# Patient Record
Sex: MALE | Race: WHITE | NOT HISPANIC OR LATINO | URBAN - NONMETROPOLITAN AREA
[De-identification: names, ages, dates, MRNs, and addresses within clinical notes are randomized per-mention and may not be internally consistent; named-entity substitution may affect disease eponyms.]

---

## 2021-09-03 ENCOUNTER — OFFICE VISIT (OUTPATIENT)
Dept: PSYCHIATRY | Facility: CLINIC | Age: 31
End: 2021-09-03

## 2021-09-03 VITALS
OXYGEN SATURATION: 98 % | DIASTOLIC BLOOD PRESSURE: 64 MMHG | HEART RATE: 92 BPM | WEIGHT: 131 LBS | BODY MASS INDEX: 25.72 KG/M2 | HEIGHT: 60 IN | SYSTOLIC BLOOD PRESSURE: 120 MMHG

## 2021-09-03 DIAGNOSIS — F33.1 MODERATE EPISODE OF RECURRENT MAJOR DEPRESSIVE DISORDER (HCC): ICD-10-CM

## 2021-09-03 DIAGNOSIS — F41.1 GENERALIZED ANXIETY DISORDER: ICD-10-CM

## 2021-09-03 DIAGNOSIS — Z79.899 MEDICATION MANAGEMENT: ICD-10-CM

## 2021-09-03 DIAGNOSIS — F39 MOOD DISORDER (HCC): ICD-10-CM

## 2021-09-03 DIAGNOSIS — F10.29 ALCOHOL DEPENDENCE WITH UNSPECIFIED ALCOHOL-INDUCED DISORDER (HCC): Primary | ICD-10-CM

## 2021-09-03 LAB
AMPHETAMINE CUT-OFF: NORMAL
BENZODIAZIPINE CUT-OFF: NORMAL
BUPRENORPHINE CUT-OFF: NORMAL
COCAINE CUT-OFF: NORMAL
EXTERNAL AMPHETAMINE SCREEN URINE: NEGATIVE
EXTERNAL BENZODIAZEPINE SCREEN URINE: NEGATIVE
EXTERNAL BUPRENORPHINE SCREEN URINE: NEGATIVE
EXTERNAL COCAINE SCREEN URINE: NEGATIVE
EXTERNAL MDMA: NEGATIVE
EXTERNAL METHADONE SCREEN URINE: NEGATIVE
EXTERNAL METHAMPHETAMINE SCREEN URINE: NEGATIVE
EXTERNAL OPIATES SCREEN URINE: NEGATIVE
EXTERNAL OXYCODONE SCREEN URINE: NEGATIVE
EXTERNAL THC SCREEN URINE: NEGATIVE
MDMA CUT-OFF: NORMAL
METHADONE CUT-OFF: NORMAL
METHAMPHETAMINE CUT-OFF: NORMAL
OPIATES CUT-OFF: NORMAL
OXYCODONE CUT-OFF: NORMAL
THC CUT-OFF: NORMAL

## 2021-09-03 PROCEDURE — 90792 PSYCH DIAG EVAL W/MED SRVCS: CPT | Performed by: NURSE PRACTITIONER

## 2021-10-01 ENCOUNTER — OFFICE VISIT (OUTPATIENT)
Dept: PSYCHIATRY | Facility: CLINIC | Age: 31
End: 2021-10-01

## 2021-10-01 ENCOUNTER — LAB (OUTPATIENT)
Dept: LAB | Facility: HOSPITAL | Age: 31
End: 2021-10-01

## 2021-10-01 VITALS
BODY MASS INDEX: 26.5 KG/M2 | OXYGEN SATURATION: 96 % | HEIGHT: 60 IN | SYSTOLIC BLOOD PRESSURE: 126 MMHG | DIASTOLIC BLOOD PRESSURE: 68 MMHG | TEMPERATURE: 97.9 F | HEART RATE: 105 BPM | WEIGHT: 135 LBS

## 2021-10-01 DIAGNOSIS — F10.29 ALCOHOL DEPENDENCE WITH UNSPECIFIED ALCOHOL-INDUCED DISORDER (HCC): Primary | ICD-10-CM

## 2021-10-01 DIAGNOSIS — F39 MOOD DISORDER (HCC): ICD-10-CM

## 2021-10-01 DIAGNOSIS — F41.1 GENERALIZED ANXIETY DISORDER: ICD-10-CM

## 2021-10-01 DIAGNOSIS — F33.1 MODERATE EPISODE OF RECURRENT MAJOR DEPRESSIVE DISORDER (HCC): ICD-10-CM

## 2021-10-01 DIAGNOSIS — Z79.899 MEDICATION MANAGEMENT: ICD-10-CM

## 2021-10-01 PROCEDURE — 84443 ASSAY THYROID STIM HORMONE: CPT | Performed by: NURSE PRACTITIONER

## 2021-10-01 PROCEDURE — 99214 OFFICE O/P EST MOD 30 MIN: CPT | Performed by: NURSE PRACTITIONER

## 2021-10-01 PROCEDURE — 80053 COMPREHEN METABOLIC PANEL: CPT | Performed by: NURSE PRACTITIONER

## 2021-10-01 PROCEDURE — 85025 COMPLETE CBC W/AUTO DIFF WBC: CPT | Performed by: NURSE PRACTITIONER

## 2021-10-01 PROCEDURE — 84439 ASSAY OF FREE THYROXINE: CPT | Performed by: NURSE PRACTITIONER

## 2021-10-01 PROCEDURE — 80061 LIPID PANEL: CPT | Performed by: NURSE PRACTITIONER

## 2021-10-01 RX ORDER — SERTRALINE HYDROCHLORIDE 25 MG/1
25 TABLET, FILM COATED ORAL DAILY
Qty: 30 TABLET | Refills: 0 | Status: SHIPPED | OUTPATIENT
Start: 2021-10-01 | End: 2021-10-29 | Stop reason: SDUPTHER

## 2021-10-02 LAB
ALBUMIN SERPL-MCNC: 4.7 G/DL (ref 3.5–5.2)
ALBUMIN/GLOB SERPL: 2.1 G/DL
ALP SERPL-CCNC: 61 U/L (ref 39–117)
ALT SERPL W P-5'-P-CCNC: 24 U/L (ref 1–41)
ANION GAP SERPL CALCULATED.3IONS-SCNC: 13.8 MMOL/L (ref 5–15)
AST SERPL-CCNC: 25 U/L (ref 1–40)
BASOPHILS # BLD AUTO: 0.05 10*3/MM3 (ref 0–0.2)
BASOPHILS NFR BLD AUTO: 0.6 % (ref 0–1.5)
BILIRUB SERPL-MCNC: <0.2 MG/DL (ref 0–1.2)
BUN SERPL-MCNC: 14 MG/DL (ref 6–20)
BUN/CREAT SERPL: 19.2 (ref 7–25)
CALCIUM SPEC-SCNC: 9.3 MG/DL (ref 8.6–10.5)
CHLORIDE SERPL-SCNC: 107 MMOL/L (ref 98–107)
CHOLEST SERPL-MCNC: 131 MG/DL (ref 0–200)
CO2 SERPL-SCNC: 20.2 MMOL/L (ref 22–29)
CREAT SERPL-MCNC: 0.73 MG/DL (ref 0.76–1.27)
DEPRECATED RDW RBC AUTO: 44.2 FL (ref 37–54)
EOSINOPHIL # BLD AUTO: 0.26 10*3/MM3 (ref 0–0.4)
EOSINOPHIL NFR BLD AUTO: 3 % (ref 0.3–6.2)
ERYTHROCYTE [DISTWIDTH] IN BLOOD BY AUTOMATED COUNT: 12.6 % (ref 12.3–15.4)
GFR SERPL CREATININE-BSD FRML MDRD: 125 ML/MIN/1.73
GLOBULIN UR ELPH-MCNC: 2.2 GM/DL
GLUCOSE SERPL-MCNC: 91 MG/DL (ref 65–99)
HCT VFR BLD AUTO: 43.8 % (ref 37.5–51)
HDLC SERPL-MCNC: 33 MG/DL (ref 40–60)
HGB BLD-MCNC: 14.8 G/DL (ref 13–17.7)
IMM GRANULOCYTES # BLD AUTO: 0.02 10*3/MM3 (ref 0–0.05)
IMM GRANULOCYTES NFR BLD AUTO: 0.2 % (ref 0–0.5)
LDLC SERPL CALC-MCNC: 84 MG/DL (ref 0–100)
LDLC/HDLC SERPL: 2.57 {RATIO}
LYMPHOCYTES # BLD AUTO: 2.61 10*3/MM3 (ref 0.7–3.1)
LYMPHOCYTES NFR BLD AUTO: 30.5 % (ref 19.6–45.3)
MCH RBC QN AUTO: 32.5 PG (ref 26.6–33)
MCHC RBC AUTO-ENTMCNC: 33.8 G/DL (ref 31.5–35.7)
MCV RBC AUTO: 96.1 FL (ref 79–97)
MONOCYTES # BLD AUTO: 0.95 10*3/MM3 (ref 0.1–0.9)
MONOCYTES NFR BLD AUTO: 11.1 % (ref 5–12)
NEUTROPHILS NFR BLD AUTO: 4.68 10*3/MM3 (ref 1.7–7)
NEUTROPHILS NFR BLD AUTO: 54.6 % (ref 42.7–76)
NRBC BLD AUTO-RTO: 0 /100 WBC (ref 0–0.2)
PLATELET # BLD AUTO: 275 10*3/MM3 (ref 140–450)
PMV BLD AUTO: 10.5 FL (ref 6–12)
POTASSIUM SERPL-SCNC: 4.2 MMOL/L (ref 3.5–5.2)
PROT SERPL-MCNC: 6.9 G/DL (ref 6–8.5)
RBC # BLD AUTO: 4.56 10*6/MM3 (ref 4.14–5.8)
SODIUM SERPL-SCNC: 141 MMOL/L (ref 136–145)
T4 FREE SERPL-MCNC: 1.17 NG/DL (ref 0.93–1.7)
TRIGL SERPL-MCNC: 66 MG/DL (ref 0–150)
TSH SERPL DL<=0.05 MIU/L-ACNC: 1.08 UIU/ML (ref 0.27–4.2)
VLDLC SERPL-MCNC: 14 MG/DL (ref 5–40)
WBC # BLD AUTO: 8.57 10*3/MM3 (ref 3.4–10.8)

## 2021-10-29 ENCOUNTER — TELEPHONE (OUTPATIENT)
Dept: PSYCHIATRY | Facility: CLINIC | Age: 31
End: 2021-10-29

## 2021-10-29 DIAGNOSIS — F41.1 GENERALIZED ANXIETY DISORDER: ICD-10-CM

## 2021-10-29 DIAGNOSIS — F33.1 MODERATE EPISODE OF RECURRENT MAJOR DEPRESSIVE DISORDER (HCC): ICD-10-CM

## 2021-10-29 DIAGNOSIS — F39 MOOD DISORDER (HCC): ICD-10-CM

## 2021-10-29 RX ORDER — SERTRALINE HYDROCHLORIDE 25 MG/1
25 TABLET, FILM COATED ORAL DAILY
Qty: 30 TABLET | Refills: 0 | Status: SHIPPED | OUTPATIENT
Start: 2021-10-29 | End: 2021-12-02 | Stop reason: SDUPTHER

## 2021-12-01 ENCOUNTER — OFFICE VISIT (OUTPATIENT)
Dept: PSYCHIATRY | Facility: CLINIC | Age: 31
End: 2021-12-01

## 2021-12-01 VITALS — WEIGHT: 146 LBS | BODY MASS INDEX: 28.51 KG/M2

## 2021-12-01 DIAGNOSIS — F39 MOOD DISORDER (HCC): ICD-10-CM

## 2021-12-01 DIAGNOSIS — F41.0 PANIC ATTACKS: ICD-10-CM

## 2021-12-01 DIAGNOSIS — F33.2 SEVERE EPISODE OF RECURRENT MAJOR DEPRESSIVE DISORDER, WITHOUT PSYCHOTIC FEATURES (HCC): Primary | ICD-10-CM

## 2021-12-01 DIAGNOSIS — F41.1 GENERALIZED ANXIETY DISORDER: ICD-10-CM

## 2021-12-01 DIAGNOSIS — T14.90XA TRAUMA: ICD-10-CM

## 2021-12-01 DIAGNOSIS — R45.4 EXCESSIVE ANGER: ICD-10-CM

## 2021-12-01 DIAGNOSIS — F10.29 ALCOHOL DEPENDENCE WITH UNSPECIFIED ALCOHOL-INDUCED DISORDER (HCC): ICD-10-CM

## 2021-12-01 PROCEDURE — 90791 PSYCH DIAGNOSTIC EVALUATION: CPT | Performed by: COUNSELOR

## 2021-12-01 RX ORDER — ONDANSETRON 4 MG/1
TABLET, ORALLY DISINTEGRATING ORAL
COMMUNITY
Start: 2021-10-02

## 2021-12-01 NOTE — PLAN OF CARE
Problem: Anger  Goal: Patient will develop specific, socially acceptable way to manage anger.  Outcome: Ongoing, Progressing     Problem: Anxiety  Goal: Patient will develop and implement behavioral and cognitive strategies to reduce anxiety and irrational fears.  Outcome: Ongoing, Progressing     Problem: Depression  Goal: Patient will demonstrate the ability to initiate new constructive life skills outside of sessions on a consistent basis.  Outcome: Ongoing, Progressing     Problem: Activity and Energy Impairment (Excessive Substance Use)  Goal: Optimized Energy Level (Excessive Substance Use)  Outcome: Ongoing, Progressing     Problem: Behavior Regulation Impairment (Excessive Substance Use)  Goal: Improved Behavioral Control (Excessive Substance Use)  Outcome: Ongoing, Progressing     Problem: Decreased Participation and Engagement (Excessive Substance Use)  Goal: Increased Participation and Engagement (Excessive Substance Use)  Outcome: Ongoing, Progressing     Problem: Physiologic Impairment (Excessive Substance Use)  Goal: Improved Physiologic Symptoms (Excessive Substance Use)  Outcome: Ongoing, Progressing  Flowsheets (Taken 12/1/2021 1627)  Mutually Determined Action Steps (Improved Physiologic Symptoms):   discusses use pattern   verbalizes physical symptoms   identifies change motive     Problem: Social, Occupational or Functional Impairment (Excessive Substance Use)  Goal: Enhanced Social, Occupational or Functional Skills (Excessive Substance Use)  Outcome: Ongoing, Progressing  Flowsheets (Taken 12/1/2021 1627)  Mutually Determined Action Steps (Enhanced Social, Occupational or Functional Skills):   participates in social skills training   identifies personal strengths   identifies support resources
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
## 2021-12-01 NOTE — TREATMENT PLAN
Multi-Disciplinary Problems (from Behavioral Health Treatment Plan)    Active Problems     Problem: Anger  Start Date: 12/01/21    Problem Details: The patient self-scales this problem as a 10 with 10 being the worst.        Goal Priority Start Date Expected End Date End Date    Patient will develop specific, socially acceptable way to manage anger. -- 12/01/21 -- --    Goal Details: Progress toward goal:  Not appropriate to rate progress toward goal since this is the initial treatment plan.        Goal Intervention Frequency Start Date End Date    Process patient's angry feelings or outbursts that have recently occurred and review alternative behaviors Q Month 12/01/21 --    Intervention Details: Duration of treatment until until remission of symptoms.        Goal Intervention Frequency Start Date End Date    Work with patient to develop constructive way to handle anger. Q Month 12/01/21 --    Intervention Details: Duration of treatment until until remission of symptoms.              Problem: Anxiety  Start Date: 12/01/21    Problem Details: The patient self-scales this problem as a 9 with 10 being the worst.        Goal Priority Start Date Expected End Date End Date    Patient will develop and implement behavioral and cognitive strategies to reduce anxiety and irrational fears. -- 12/01/21 -- --    Goal Details: Progress toward goal:  Not appropriate to rate progress toward goal since this is the initial treatment plan.        Goal Intervention Frequency Start Date End Date    Help patient explore past emotional issues in relation to present anxiety. Q Month 12/01/21 --    Intervention Details: Duration of treatment until until remission of symptoms.        Goal Intervention Frequency Start Date End Date    Help patient develop an awareness of their cognitive and physical responses to anxiety. Q Month 12/01/21 --    Intervention Details: Duration of treatment until until remission of symptoms.               Problem: Depression  Start Date: 12/01/21    Problem Details: The patient self-scales this problem as a 9 with 10 being the worst.        Goal Priority Start Date Expected End Date End Date    Patient will demonstrate the ability to initiate new constructive life skills outside of sessions on a consistent basis. -- 12/01/21 -- --    Goal Details: Progress toward goal:  Not appropriate to rate progress toward goal since this is the initial treatment plan.        Goal Intervention Frequency Start Date End Date    Assist patient in setting attainable activities of daily living goals. PRN 12/01/21 --    Goal Intervention Frequency Start Date End Date    Provide education about depression Q Month 12/01/21 --    Intervention Details: Duration of treatment until until remission of symptoms.        Goal Intervention Frequency Start Date End Date    Assist patient in developing healthy coping strategies. Q Month 12/01/21 --    Intervention Details: Duration of treatment until until remission of symptoms.                           I have discussed and reviewed this treatment plan with the patient.  It has been printed for signatures.

## 2021-12-02 ENCOUNTER — OFFICE VISIT (OUTPATIENT)
Dept: PSYCHIATRY | Facility: CLINIC | Age: 31
End: 2021-12-02

## 2021-12-02 ENCOUNTER — TELEPHONE (OUTPATIENT)
Dept: PSYCHIATRY | Facility: CLINIC | Age: 31
End: 2021-12-02

## 2021-12-02 VITALS
DIASTOLIC BLOOD PRESSURE: 78 MMHG | HEIGHT: 60 IN | BODY MASS INDEX: 26.7 KG/M2 | WEIGHT: 136 LBS | SYSTOLIC BLOOD PRESSURE: 120 MMHG | HEART RATE: 70 BPM

## 2021-12-02 DIAGNOSIS — F33.2 SEVERE EPISODE OF RECURRENT MAJOR DEPRESSIVE DISORDER, WITHOUT PSYCHOTIC FEATURES (HCC): Primary | ICD-10-CM

## 2021-12-02 DIAGNOSIS — F39 MOOD DISORDER (HCC): ICD-10-CM

## 2021-12-02 DIAGNOSIS — Z79.899 MEDICATION MANAGEMENT: ICD-10-CM

## 2021-12-02 DIAGNOSIS — F33.1 MODERATE EPISODE OF RECURRENT MAJOR DEPRESSIVE DISORDER (HCC): ICD-10-CM

## 2021-12-02 DIAGNOSIS — F10.29 ALCOHOL DEPENDENCE WITH UNSPECIFIED ALCOHOL-INDUCED DISORDER (HCC): ICD-10-CM

## 2021-12-02 DIAGNOSIS — F41.1 GENERALIZED ANXIETY DISORDER: ICD-10-CM

## 2021-12-02 PROCEDURE — 99214 OFFICE O/P EST MOD 30 MIN: CPT | Performed by: NURSE PRACTITIONER

## 2022-01-03 DIAGNOSIS — F41.1 GENERALIZED ANXIETY DISORDER: ICD-10-CM

## 2022-01-03 DIAGNOSIS — F33.1 MODERATE EPISODE OF RECURRENT MAJOR DEPRESSIVE DISORDER: ICD-10-CM

## 2022-01-03 DIAGNOSIS — F39 MOOD DISORDER: ICD-10-CM
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
## 2022-02-16 ENCOUNTER — OFFICE VISIT (OUTPATIENT)
Dept: PSYCHIATRY | Facility: CLINIC | Age: 32
End: 2022-02-16

## 2022-02-16 VITALS
WEIGHT: 138.8 LBS | BODY MASS INDEX: 27.25 KG/M2 | DIASTOLIC BLOOD PRESSURE: 60 MMHG | HEART RATE: 89 BPM | SYSTOLIC BLOOD PRESSURE: 102 MMHG | HEIGHT: 60 IN

## 2022-02-16 DIAGNOSIS — F41.1 GENERALIZED ANXIETY DISORDER: Primary | ICD-10-CM

## 2022-02-16 DIAGNOSIS — F10.29 ALCOHOL DEPENDENCE WITH UNSPECIFIED ALCOHOL-INDUCED DISORDER: ICD-10-CM

## 2022-02-16 DIAGNOSIS — R45.4 EXCESSIVE ANGER: ICD-10-CM

## 2022-02-16 DIAGNOSIS — F33.1 MODERATE EPISODE OF RECURRENT MAJOR DEPRESSIVE DISORDER: ICD-10-CM

## 2022-02-16 DIAGNOSIS — Z79.899 MEDICATION MANAGEMENT: ICD-10-CM

## 2022-02-16 DIAGNOSIS — T14.90XA TRAUMA: ICD-10-CM

## 2022-02-16 DIAGNOSIS — F39 MOOD DISORDER: ICD-10-CM

## 2022-02-16 PROCEDURE — 99214 OFFICE O/P EST MOD 30 MIN: CPT | Performed by: NURSE PRACTITIONER

## 2022-02-16 RX ORDER — FAMOTIDINE 20 MG/1
TABLET, FILM COATED ORAL
COMMUNITY
Start: 2022-02-05

## 2022-02-16 RX ORDER — METHOCARBAMOL 500 MG/1
TABLET, FILM COATED ORAL
COMMUNITY
Start: 2022-01-31

## 2022-02-16 RX ORDER — CROMOLYN SODIUM 5.2 MG
AEROSOL, SPRAY WITH PUMP (ML) NASAL
COMMUNITY
Start: 2022-02-05

## 2022-02-17 ENCOUNTER — OFFICE VISIT (OUTPATIENT)
Dept: PSYCHIATRY | Facility: CLINIC | Age: 32
End: 2022-02-17

## 2022-02-17 DIAGNOSIS — R45.4 EXCESSIVE ANGER: ICD-10-CM

## 2022-02-17 DIAGNOSIS — F33.1 MODERATE EPISODE OF RECURRENT MAJOR DEPRESSIVE DISORDER: ICD-10-CM

## 2022-02-17 DIAGNOSIS — F41.1 GENERALIZED ANXIETY DISORDER: Primary | ICD-10-CM

## 2022-02-17 DIAGNOSIS — F39 MOOD DISORDER: ICD-10-CM

## 2022-02-17 DIAGNOSIS — F10.29 ALCOHOL DEPENDENCE WITH UNSPECIFIED ALCOHOL-INDUCED DISORDER: ICD-10-CM

## 2022-02-17 PROCEDURE — 90837 PSYTX W PT 60 MINUTES: CPT | Performed by: COUNSELOR
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
## 2022-02-17 NOTE — PSYCHOTHERAPY NOTE
Risk Assessment:  [x] No SI/HI, [x]  passive thoughts by history [x]  suicidal ideation with intent, plan, and/or means by history [x]  homicidal ideation by history  [x] self-harm by history (Explain:  Used a razor to cut - hospitalized at [facility] in [place] in 2010).

## 2022-03-07 ENCOUNTER — PATIENT MESSAGE (OUTPATIENT)
Dept: PSYCHIATRY | Facility: CLINIC | Age: 32
End: 2022-03-07

## 2022-03-07 ENCOUNTER — OFFICE VISIT (OUTPATIENT)
Dept: PSYCHIATRY | Facility: CLINIC | Age: 32
End: 2022-03-07

## 2022-03-07 DIAGNOSIS — F43.10 POST TRAUMATIC STRESS DISORDER (PTSD): Primary | ICD-10-CM

## 2022-03-07 DIAGNOSIS — R45.4 EXCESSIVE ANGER: ICD-10-CM

## 2022-03-07 DIAGNOSIS — F10.29 ALCOHOL DEPENDENCE WITH UNSPECIFIED ALCOHOL-INDUCED DISORDER: ICD-10-CM

## 2022-03-07 DIAGNOSIS — F33.1 MODERATE EPISODE OF RECURRENT MAJOR DEPRESSIVE DISORDER: ICD-10-CM

## 2022-03-07 DIAGNOSIS — T14.90XA TRAUMA: ICD-10-CM

## 2022-03-07 DIAGNOSIS — F41.1 GENERALIZED ANXIETY DISORDER: ICD-10-CM

## 2022-03-07 DIAGNOSIS — F33.2 SEVERE EPISODE OF RECURRENT MAJOR DEPRESSIVE DISORDER, WITHOUT PSYCHOTIC FEATURES: ICD-10-CM

## 2022-03-07 DIAGNOSIS — F41.0 PANIC ATTACKS: ICD-10-CM

## 2022-03-07 DIAGNOSIS — F41.1 GENERALIZED ANXIETY DISORDER: Primary | ICD-10-CM

## 2022-03-07 PROCEDURE — 90837 PSYTX W PT 60 MINUTES: CPT | Performed by: COUNSELOR

## 2022-03-07 RX ORDER — FLUOXETINE 10 MG/1
10 CAPSULE ORAL DAILY
Qty: 30 CAPSULE | Refills: 0 | Status: SHIPPED | OUTPATIENT
Start: 2022-03-07 | End: 2022-04-11 | Stop reason: SDUPTHER

## 2022-03-07 NOTE — TREATMENT PLAN
Multi-Disciplinary Problems (from Behavioral Health Treatment Plan)    Active Problems     Problem: Anger  Start Date: 12/01/21    Problem Details: The patient self-scales this problem as a 8 with 10 being the worst.      Goal Priority Start Date Expected End Date End Date    Patient will develop specific, socially acceptable way to manage anger. -- 12/01/21 -- --    Goal Details: Progress toward goal:  Pt has [id number] since onset of treatment.  He is learning to identify faulty thoughts and is beginning to face difficult emotions as well.    Goal Intervention Frequency Start Date End Date    Process patient's angry feelings or outbursts that have recently occurred and review alternative behaviors Q Month 12/01/21 --    Intervention Details: Duration of treatment until until remission of symptoms.      Goal Intervention Frequency Start Date End Date    Work with patient to develop constructive way to handle anger. Q Month 12/01/21 --    Intervention Details: Duration of treatment until until remission of symptoms.            Problem: Anxiety  Start Date: 12/01/21    Problem Details: The patient self-scales this problem as a 8.5 with 10 being the worst.      Goal Priority Start Date Expected End Date End Date    Patient will develop and implement behavioral and cognitive strategies to reduce anxiety and irrational fears. -- 12/01/21 -- --    Goal Details: Progress toward goal:  Pt has [id number] since onset of treatment.  He is learning to identify faulty thoughts and is beginning to face difficult emotions as well      Goal Intervention Frequency Start Date End Date    Help patient explore past emotional issues in relation to present anxiety. Q Month 12/01/21 --    Intervention Details: Duration of treatment until until remission of symptoms.      Goal Intervention Frequency Start Date End Date    Help patient develop an awareness of their cognitive and physical responses to anxiety. Q Month 12/01/21 --     Intervention Details: Duration of treatment until until remission of symptoms.            Problem: Depression  Start Date: 12/01/21    Problem Details: The patient self-scales this problem as a 8.5 with 10 being the worst.        Goal Priority Start Date Expected End Date End Date    Patient will demonstrate the ability to initiate new constructive life skills outside of sessions on a consistent basis. -- 12/01/21 -- --    Goal Details: Progress toward goal:  Pt has [id number] since onset of treatment.  He is learning to identify faulty thoughts and is beginning to face difficult emotions as well      Goal Intervention Frequency Start Date End Date    Assist patient in setting attainable activities of daily living goals. PRN 12/01/21 --    Goal Intervention Frequency Start Date End Date    Provide education about depression Q Month 12/01/21 --    Intervention Details: Duration of treatment until until remission of symptoms.      Goal Intervention Frequency Start Date End Date    Assist patient in developing healthy coping strategies. Q Month 12/01/21 --    Intervention Details: Duration of treatment until until remission of symptoms.                         I have discussed and reviewed this treatment plan with the patient.  It has been printed for signatures.
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
## 2022-03-08 ENCOUNTER — OFFICE VISIT (OUTPATIENT)
Dept: PSYCHIATRY | Facility: CLINIC | Age: 32
End: 2022-03-08

## 2022-03-08 VITALS — BODY MASS INDEX: 27.11 KG/M2 | HEIGHT: 60 IN

## 2022-03-08 DIAGNOSIS — F43.10 POST TRAUMATIC STRESS DISORDER (PTSD): ICD-10-CM

## 2022-03-08 DIAGNOSIS — F10.20 ALCOHOL USE DISORDER, SEVERE, DEPENDENCE: Primary | ICD-10-CM

## 2022-03-08 DIAGNOSIS — Z79.899 MEDICATION MANAGEMENT: ICD-10-CM

## 2022-03-08 DIAGNOSIS — F33.2 SEVERE EPISODE OF RECURRENT MAJOR DEPRESSIVE DISORDER, WITHOUT PSYCHOTIC FEATURES: ICD-10-CM

## 2022-03-08 DIAGNOSIS — F13.20 SEDATIVE, HYPNOTIC OR ANXIOLYTIC USE DISORDER, SEVERE, DEPENDENCE: ICD-10-CM

## 2022-03-08 DIAGNOSIS — F12.10 CANNABIS USE DISORDER, MILD, ABUSE: ICD-10-CM

## 2022-03-08 LAB
EXTERNAL AMPHETAMINE SCREEN URINE: NEGATIVE
EXTERNAL BENZODIAZEPINE SCREEN URINE: NEGATIVE
EXTERNAL BUPRENORPHINE SCREEN URINE: NEGATIVE
EXTERNAL COCAINE SCREEN URINE: NEGATIVE
EXTERNAL MDMA: NEGATIVE
EXTERNAL METHADONE SCREEN URINE: NEGATIVE
EXTERNAL METHAMPHETAMINE SCREEN URINE: NEGATIVE
EXTERNAL OPIATES SCREEN URINE: NEGATIVE
EXTERNAL OXYCODONE SCREEN URINE: NEGATIVE
EXTERNAL THC SCREEN URINE: POSITIVE

## 2022-03-08 PROCEDURE — 90837 PSYTX W PT 60 MINUTES: CPT
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
## 2022-03-14 ENCOUNTER — HOSPITAL ENCOUNTER (OUTPATIENT)
Dept: RESPIRATORY THERAPY | Facility: HOSPITAL | Age: 32
Discharge: HOME | End: 2022-03-14
Admitting: NURSE PRACTITIONER

## 2022-03-14 ENCOUNTER — OFFICE VISIT (OUTPATIENT)
Dept: PSYCHIATRY | Facility: CLINIC | Age: 32
End: 2022-03-14

## 2022-03-14 VITALS
HEIGHT: 60 IN | WEIGHT: 138.6 LBS | SYSTOLIC BLOOD PRESSURE: 118 MMHG | DIASTOLIC BLOOD PRESSURE: 78 MMHG | BODY MASS INDEX: 27.21 KG/M2 | HEART RATE: 98 BPM

## 2022-03-14 DIAGNOSIS — F12.10 CANNABIS USE DISORDER, MILD, ABUSE: ICD-10-CM

## 2022-03-14 DIAGNOSIS — F10.20 ALCOHOL USE DISORDER, SEVERE, DEPENDENCE: ICD-10-CM

## 2022-03-14 DIAGNOSIS — F39 MOOD DISORDER: ICD-10-CM

## 2022-03-14 DIAGNOSIS — R07.9 CHEST PAIN, UNSPECIFIED TYPE: ICD-10-CM

## 2022-03-14 DIAGNOSIS — F10.29 ALCOHOL DEPENDENCE WITH UNSPECIFIED ALCOHOL-INDUCED DISORDER: ICD-10-CM

## 2022-03-14 DIAGNOSIS — Z79.899 MEDICATION MANAGEMENT: ICD-10-CM

## 2022-03-14 DIAGNOSIS — T14.90XA TRAUMA: ICD-10-CM

## 2022-03-14 DIAGNOSIS — F41.0 PANIC ATTACKS: ICD-10-CM

## 2022-03-14 DIAGNOSIS — R45.4 EXCESSIVE ANGER: ICD-10-CM

## 2022-03-14 DIAGNOSIS — F33.1 MODERATE EPISODE OF RECURRENT MAJOR DEPRESSIVE DISORDER: ICD-10-CM

## 2022-03-14 DIAGNOSIS — F13.20 SEDATIVE, HYPNOTIC OR ANXIOLYTIC USE DISORDER, SEVERE, DEPENDENCE: ICD-10-CM

## 2022-03-14 DIAGNOSIS — F41.1 GENERALIZED ANXIETY DISORDER: Primary | ICD-10-CM

## 2022-03-14 DIAGNOSIS — F43.10 POST TRAUMATIC STRESS DISORDER (PTSD): ICD-10-CM

## 2022-03-14 LAB
QT INTERVAL: 370 MS
QTC INTERVAL: 375 MS

## 2022-03-14 PROCEDURE — 99214 OFFICE O/P EST MOD 30 MIN: CPT | Performed by: NURSE PRACTITIONER

## 2022-03-14 PROCEDURE — 93005 ELECTROCARDIOGRAM TRACING: CPT | Performed by: NURSE PRACTITIONER

## 2022-04-11 ENCOUNTER — OFFICE VISIT (OUTPATIENT)
Dept: PSYCHIATRY | Facility: CLINIC | Age: 32
End: 2022-04-11

## 2022-04-11 VITALS
DIASTOLIC BLOOD PRESSURE: 72 MMHG | WEIGHT: 135.4 LBS | HEIGHT: 60 IN | HEART RATE: 105 BPM | BODY MASS INDEX: 26.58 KG/M2 | SYSTOLIC BLOOD PRESSURE: 121 MMHG

## 2022-04-11 DIAGNOSIS — Z79.899 MEDICATION MANAGEMENT: ICD-10-CM

## 2022-04-11 DIAGNOSIS — F10.29 ALCOHOL DEPENDENCE WITH UNSPECIFIED ALCOHOL-INDUCED DISORDER: ICD-10-CM

## 2022-04-11 DIAGNOSIS — F33.1 MODERATE EPISODE OF RECURRENT MAJOR DEPRESSIVE DISORDER: ICD-10-CM

## 2022-04-11 DIAGNOSIS — F41.1 GENERALIZED ANXIETY DISORDER: Primary | ICD-10-CM

## 2022-04-11 DIAGNOSIS — F39 MOOD DISORDER: ICD-10-CM

## 2022-04-11 DIAGNOSIS — T14.90XA TRAUMA: ICD-10-CM

## 2022-04-11 DIAGNOSIS — F41.0 PANIC ATTACKS: ICD-10-CM

## 2022-04-11 DIAGNOSIS — R45.4 EXCESSIVE ANGER: ICD-10-CM

## 2022-04-11 PROCEDURE — 99214 OFFICE O/P EST MOD 30 MIN: CPT | Performed by: NURSE PRACTITIONER

## 2022-04-11 RX ORDER — FLUOXETINE HYDROCHLORIDE 20 MG/1
20 CAPSULE ORAL DAILY
Qty: 30 CAPSULE | Refills: 1 | Status: SHIPPED | OUTPATIENT
Start: 2022-04-11 | End: 2022-06-06 | Stop reason: SDUPTHER

## 2022-06-06 ENCOUNTER — OFFICE VISIT (OUTPATIENT)
Dept: PSYCHIATRY | Facility: CLINIC | Age: 32
End: 2022-06-06

## 2022-06-06 VITALS
DIASTOLIC BLOOD PRESSURE: 70 MMHG | HEART RATE: 75 BPM | BODY MASS INDEX: 25.45 KG/M2 | SYSTOLIC BLOOD PRESSURE: 122 MMHG | WEIGHT: 129.6 LBS | HEIGHT: 60 IN

## 2022-06-06 DIAGNOSIS — F41.0 PANIC ATTACKS: ICD-10-CM

## 2022-06-06 DIAGNOSIS — F39 MOOD DISORDER: ICD-10-CM

## 2022-06-06 DIAGNOSIS — R45.4 EXCESSIVE ANGER: ICD-10-CM

## 2022-06-06 DIAGNOSIS — T14.90XA TRAUMA: ICD-10-CM

## 2022-06-06 DIAGNOSIS — Z79.899 MEDICATION MANAGEMENT: ICD-10-CM

## 2022-06-06 DIAGNOSIS — F33.1 MODERATE EPISODE OF RECURRENT MAJOR DEPRESSIVE DISORDER: ICD-10-CM

## 2022-06-06 DIAGNOSIS — F41.1 GENERALIZED ANXIETY DISORDER: Primary | ICD-10-CM

## 2022-06-06 DIAGNOSIS — F10.29 ALCOHOL DEPENDENCE WITH UNSPECIFIED ALCOHOL-INDUCED DISORDER: ICD-10-CM

## 2022-06-06 PROCEDURE — 99214 OFFICE O/P EST MOD 30 MIN: CPT | Performed by: NURSE PRACTITIONER

## 2022-06-06 RX ORDER — FLUOXETINE 10 MG/1
10 CAPSULE ORAL DAILY
Qty: 30 CAPSULE | Refills: 0 | Status: SHIPPED | OUTPATIENT
Start: 2022-06-06 | End: 2022-07-01

## 2022-06-06 RX ORDER — FLUOXETINE HYDROCHLORIDE 20 MG/1
20 CAPSULE ORAL DAILY
Qty: 30 CAPSULE | Refills: 0 | Status: SHIPPED | OUTPATIENT
Start: 2022-06-06 | End: 2022-07-01

## 2022-07-01 ENCOUNTER — OFFICE VISIT (OUTPATIENT)
Dept: PSYCHIATRY | Facility: CLINIC | Age: 32
End: 2022-07-01

## 2022-07-01 VITALS
DIASTOLIC BLOOD PRESSURE: 68 MMHG | SYSTOLIC BLOOD PRESSURE: 124 MMHG | WEIGHT: 131.8 LBS | HEIGHT: 60 IN | BODY MASS INDEX: 25.87 KG/M2 | HEART RATE: 80 BPM

## 2022-07-01 DIAGNOSIS — F39 MOOD DISORDER: ICD-10-CM

## 2022-07-01 DIAGNOSIS — F10.29 ALCOHOL DEPENDENCE WITH UNSPECIFIED ALCOHOL-INDUCED DISORDER: ICD-10-CM

## 2022-07-01 DIAGNOSIS — Z79.899 MEDICATION MANAGEMENT: ICD-10-CM

## 2022-07-01 DIAGNOSIS — R45.4 EXCESSIVE ANGER: ICD-10-CM

## 2022-07-01 DIAGNOSIS — F41.1 GENERALIZED ANXIETY DISORDER: Primary | ICD-10-CM

## 2022-07-01 DIAGNOSIS — F41.0 PANIC ATTACKS: ICD-10-CM

## 2022-07-01 DIAGNOSIS — T14.90XA TRAUMA: ICD-10-CM

## 2022-07-01 DIAGNOSIS — F33.1 MODERATE EPISODE OF RECURRENT MAJOR DEPRESSIVE DISORDER: ICD-10-CM

## 2022-07-01 PROCEDURE — 99214 OFFICE O/P EST MOD 30 MIN: CPT | Performed by: NURSE PRACTITIONER

## 2022-07-01 RX ORDER — FLUOXETINE HYDROCHLORIDE 40 MG/1
40 CAPSULE ORAL DAILY
Qty: 30 CAPSULE | Refills: 1 | Status: SHIPPED | OUTPATIENT
Start: 2022-07-01 | End: 2023-07-01

## 2022-08-08 ENCOUNTER — TELEPHONE (OUTPATIENT)
Dept: PSYCHIATRY | Facility: CLINIC | Age: 32
End: 2022-08-08

## 2022-08-08 NOTE — TELEPHONE ENCOUNTER
Pt rescheduled from 8/8 apt. Pt needing his prozac increased, he thinks the smaller dose is not working like it should.

## 2022-08-10 ENCOUNTER — TELEPHONE (OUTPATIENT)
Dept: PSYCHIATRY | Facility: CLINIC | Age: 32
End: 2022-08-10

## 2022-08-10 DIAGNOSIS — F33.1 MODERATE EPISODE OF RECURRENT MAJOR DEPRESSIVE DISORDER: Primary | ICD-10-CM

## 2022-08-10 DIAGNOSIS — F39 MOOD DISORDER: ICD-10-CM

## 2022-08-10 RX ORDER — ARIPIPRAZOLE 2 MG/1
2 TABLET ORAL DAILY
Qty: 30 TABLET | Refills: 0 | Status: SHIPPED | OUTPATIENT
Start: 2022-08-10

## 2022-08-16 ENCOUNTER — DOCUMENTATION (OUTPATIENT)
Dept: PSYCHIATRY | Facility: CLINIC | Age: 32
End: 2022-08-16

## 2022-08-16 ENCOUNTER — TELEPHONE (OUTPATIENT)
Dept: PSYCHIATRY | Facility: CLINIC | Age: 32
End: 2022-08-16
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
## 2022-08-17 ENCOUNTER — TELEPHONE (OUTPATIENT)
Dept: PSYCHIATRY | Facility: HOSPITAL | Age: 32
End: 2022-08-17

## 2022-08-19 ENCOUNTER — NURSE TRIAGE (OUTPATIENT)
Dept: CALL CENTER | Facility: HOSPITAL | Age: 32
End: 2022-08-19
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
## 2022-08-20 ENCOUNTER — HOSPITAL ENCOUNTER (EMERGENCY)
Facility: HOSPITAL | Age: 32
Discharge: HOME | End: 2022-08-20
Attending: FAMILY MEDICINE | Admitting: FAMILY MEDICINE

## 2022-08-20 VITALS
RESPIRATION RATE: 18 BRPM | WEIGHT: 143 LBS | BODY MASS INDEX: 21.18 KG/M2 | HEIGHT: 69 IN | OXYGEN SATURATION: 100 % | SYSTOLIC BLOOD PRESSURE: 115 MMHG | TEMPERATURE: 98.5 F | DIASTOLIC BLOOD PRESSURE: 78 MMHG | HEART RATE: 95 BPM

## 2022-08-20 DIAGNOSIS — T24.202D: ICD-10-CM

## 2022-08-20 DIAGNOSIS — T24.201D: ICD-10-CM

## 2022-08-20 DIAGNOSIS — T21.22XD: Primary | ICD-10-CM

## 2022-08-20 LAB
ALBUMIN SERPL-MCNC: 4.26 G/DL (ref 3.5–5.2)
ALBUMIN/GLOB SERPL: 1.8 G/DL
ALP SERPL-CCNC: 67 U/L (ref 39–117)
ALT SERPL W P-5'-P-CCNC: 13 U/L (ref 1–41)
ANION GAP SERPL CALCULATED.3IONS-SCNC: 13.6 MMOL/L (ref 5–15)
AST SERPL-CCNC: 17 U/L (ref 1–40)
BASOPHILS # BLD AUTO: 0.06 10*3/MM3 (ref 0–0.2)
BASOPHILS NFR BLD AUTO: 0.6 % (ref 0–1.5)
BILIRUB SERPL-MCNC: 0.2 MG/DL (ref 0–1.2)
BUN SERPL-MCNC: 16 MG/DL (ref 6–20)
BUN/CREAT SERPL: 17 (ref 7–25)
CALCIUM SPEC-SCNC: 9.4 MG/DL (ref 8.6–10.5)
CHLORIDE SERPL-SCNC: 103 MMOL/L (ref 98–107)
CK SERPL-CCNC: 209 U/L (ref 20–200)
CO2 SERPL-SCNC: 25.4 MMOL/L (ref 22–29)
CREAT SERPL-MCNC: 0.94 MG/DL (ref 0.76–1.27)
CRP SERPL-MCNC: 4.21 MG/DL (ref 0–0.5)
D-LACTATE SERPL-SCNC: 1.8 MMOL/L (ref 0.5–2)
DEPRECATED RDW RBC AUTO: 46.2 FL (ref 37–54)
EGFRCR SERPLBLD CKD-EPI 2021: 109.8 ML/MIN/1.73
EOSINOPHIL # BLD AUTO: 0.62 10*3/MM3 (ref 0–0.4)
EOSINOPHIL NFR BLD AUTO: 6.2 % (ref 0.3–6.2)
ERYTHROCYTE [DISTWIDTH] IN BLOOD BY AUTOMATED COUNT: 13.1 % (ref 12.3–15.4)
ETHANOL BLD-MCNC: <10 MG/DL (ref 0–10)
ETHANOL UR QL: <0.01 %
GLOBULIN UR ELPH-MCNC: 2.3 GM/DL
GLUCOSE SERPL-MCNC: 87 MG/DL (ref 65–99)
HCT VFR BLD AUTO: 44.2 % (ref 37.5–51)
HGB BLD-MCNC: 15.1 G/DL (ref 13–17.7)
HOLD SPECIMEN: NORMAL
HOLD SPECIMEN: NORMAL
IMM GRANULOCYTES # BLD AUTO: 0.03 10*3/MM3 (ref 0–0.05)
IMM GRANULOCYTES NFR BLD AUTO: 0.3 % (ref 0–0.5)
LYMPHOCYTES # BLD AUTO: 3.02 10*3/MM3 (ref 0.7–3.1)
LYMPHOCYTES NFR BLD AUTO: 30 % (ref 19.6–45.3)
MAGNESIUM SERPL-MCNC: 2.1 MG/DL (ref 1.6–2.6)
MCH RBC QN AUTO: 32.6 PG (ref 26.6–33)
MCHC RBC AUTO-ENTMCNC: 34.2 G/DL (ref 31.5–35.7)
MCV RBC AUTO: 95.5 FL (ref 79–97)
MONOCYTES # BLD AUTO: 1.16 10*3/MM3 (ref 0.1–0.9)
MONOCYTES NFR BLD AUTO: 11.5 % (ref 5–12)
NEUTROPHILS NFR BLD AUTO: 5.18 10*3/MM3 (ref 1.7–7)
NEUTROPHILS NFR BLD AUTO: 51.4 % (ref 42.7–76)
NRBC BLD AUTO-RTO: 0 /100 WBC (ref 0–0.2)
PLATELET # BLD AUTO: 254 10*3/MM3 (ref 140–450)
PMV BLD AUTO: 9.1 FL (ref 6–12)
POTASSIUM SERPL-SCNC: 4.1 MMOL/L (ref 3.5–5.2)
PROCALCITONIN SERPL-MCNC: 0.03 NG/ML (ref 0–0.25)
PROT SERPL-MCNC: 6.6 G/DL (ref 6–8.5)
RBC # BLD AUTO: 4.63 10*6/MM3 (ref 4.14–5.8)
SODIUM SERPL-SCNC: 142 MMOL/L (ref 136–145)
WBC NRBC COR # BLD: 10.07 10*3/MM3 (ref 3.4–10.8)
WHOLE BLOOD HOLD COAG: NORMAL
WHOLE BLOOD HOLD SPECIMEN: NORMAL

## 2022-08-20 PROCEDURE — 99284 EMERGENCY DEPT VISIT MOD MDM: CPT

## 2022-08-20 PROCEDURE — 82550 ASSAY OF CK (CPK): CPT | Performed by: FAMILY MEDICINE

## 2022-08-20 PROCEDURE — 83735 ASSAY OF MAGNESIUM: CPT | Performed by: FAMILY MEDICINE

## 2022-08-20 PROCEDURE — 85025 COMPLETE CBC W/AUTO DIFF WBC: CPT | Performed by: FAMILY MEDICINE

## 2022-08-20 PROCEDURE — 80053 COMPREHEN METABOLIC PANEL: CPT | Performed by: FAMILY MEDICINE

## 2022-08-20 PROCEDURE — 25010000002 HYDROMORPHONE PER 4 MG: Performed by: FAMILY MEDICINE

## 2022-08-20 PROCEDURE — 96374 THER/PROPH/DIAG INJ IV PUSH: CPT

## 2022-08-20 PROCEDURE — 84145 PROCALCITONIN (PCT): CPT | Performed by: FAMILY MEDICINE

## 2022-08-20 PROCEDURE — 83605 ASSAY OF LACTIC ACID: CPT | Performed by: FAMILY MEDICINE

## 2022-08-20 PROCEDURE — 82077 ASSAY SPEC XCP UR&BREATH IA: CPT | Performed by: FAMILY MEDICINE

## 2022-08-20 PROCEDURE — 87040 BLOOD CULTURE FOR BACTERIA: CPT | Performed by: FAMILY MEDICINE

## 2022-08-20 PROCEDURE — 86140 C-REACTIVE PROTEIN: CPT | Performed by: FAMILY MEDICINE

## 2022-08-20 RX ORDER — GAUZE BANDAGE 4" X 4"
1 SPONGE TOPICAL DAILY
Qty: 24 EACH | Refills: 0 | OUTPATIENT
Start: 2022-08-20 | End: 2023-03-08

## 2022-08-20 RX ORDER — SODIUM CHLORIDE 0.9 % (FLUSH) 0.9 %
10 SYRINGE (ML) INJECTION AS NEEDED
Status: DISCONTINUED | OUTPATIENT
Start: 2022-08-20 | End: 2022-08-20 | Stop reason: HOSPADM

## 2022-08-20 RX ORDER — HYDROMORPHONE HYDROCHLORIDE 1 MG/ML
0.5 INJECTION, SOLUTION INTRAMUSCULAR; INTRAVENOUS; SUBCUTANEOUS ONCE
Status: COMPLETED | OUTPATIENT
Start: 2022-08-20 | End: 2022-08-20

## 2022-08-20 RX ORDER — HYDROCODONE BITARTRATE AND ACETAMINOPHEN 5; 325 MG/1; MG/1
1 TABLET ORAL EVERY 8 HOURS PRN
Qty: 10 TABLET | Refills: 0 | OUTPATIENT
Start: 2022-08-20 | End: 2023-03-08

## 2022-08-20 RX ADMIN — SODIUM CHLORIDE, POTASSIUM CHLORIDE, SODIUM LACTATE AND CALCIUM CHLORIDE 1000 ML: 600; 310; 30; 20 INJECTION, SOLUTION INTRAVENOUS at 15:08

## 2022-08-20 RX ADMIN — HYDROMORPHONE HYDROCHLORIDE 0.5 MG: 1 INJECTION, SOLUTION INTRAMUSCULAR; INTRAVENOUS; SUBCUTANEOUS at 15:04

## 2022-08-20 RX ADMIN — SILVER SULFADIAZINE 1 APPLICATION: 10 CREAM TOPICAL at 16:34

## 2022-08-20 NOTE — DISCHARGE INSTRUCTIONS
Clean wounds with warm soapy water.  Apply Silvadene daily to affected area with overlying Adaptic and then nonadherent and then Curlex.  Follow-up with your family doctor this week.
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
## 2022-08-25 LAB
BACTERIA SPEC AEROBE CULT: NORMAL
BACTERIA SPEC AEROBE CULT: NORMAL

## 2022-09-14 NOTE — TELEPHONE ENCOUNTER
Left voicemail stating what his provider stated, and that was to continue prozac, and she added abilify 2mg tablet 1 a day

## 2023-03-08 ENCOUNTER — HOSPITAL ENCOUNTER (EMERGENCY)
Facility: HOSPITAL | Age: 33
Discharge: ANOTHER HOSPITAL | End: 2023-03-08
Attending: STUDENT IN AN ORGANIZED HEALTH CARE EDUCATION/TRAINING PROGRAM | Admitting: STUDENT IN AN ORGANIZED HEALTH CARE EDUCATION/TRAINING PROGRAM
Payer: COMMERCIAL

## 2023-03-08 VITALS
OXYGEN SATURATION: 97 % | HEIGHT: 69 IN | TEMPERATURE: 98.4 F | HEART RATE: 92 BPM | WEIGHT: 145 LBS | SYSTOLIC BLOOD PRESSURE: 140 MMHG | BODY MASS INDEX: 21.48 KG/M2 | DIASTOLIC BLOOD PRESSURE: 76 MMHG | RESPIRATION RATE: 18 BRPM

## 2023-03-08 DIAGNOSIS — F10.10 ALCOHOL ABUSE: Primary | ICD-10-CM

## 2023-03-08 LAB
ALBUMIN SERPL-MCNC: 4.4 G/DL (ref 3.5–5.2)
ALBUMIN/GLOB SERPL: 1.6 G/DL
ALP SERPL-CCNC: 66 U/L (ref 39–117)
ALT SERPL W P-5'-P-CCNC: 21 U/L (ref 1–41)
AMPHET+METHAMPHET UR QL: NEGATIVE
AMPHETAMINES UR QL: NEGATIVE
ANION GAP SERPL CALCULATED.3IONS-SCNC: 14.4 MMOL/L (ref 5–15)
AST SERPL-CCNC: 28 U/L (ref 1–40)
BARBITURATES UR QL SCN: NEGATIVE
BASOPHILS # BLD AUTO: 0.04 10*3/MM3 (ref 0–0.2)
BASOPHILS NFR BLD AUTO: 0.2 % (ref 0–1.5)
BENZODIAZ UR QL SCN: NEGATIVE
BILIRUB SERPL-MCNC: 0.4 MG/DL (ref 0–1.2)
BILIRUB UR QL STRIP: NEGATIVE
BUN SERPL-MCNC: 13 MG/DL (ref 6–20)
BUN/CREAT SERPL: 14.4 (ref 7–25)
BUPRENORPHINE SERPL-MCNC: NEGATIVE NG/ML
CALCIUM SPEC-SCNC: 9.4 MG/DL (ref 8.6–10.5)
CANNABINOIDS SERPL QL: POSITIVE
CHLORIDE SERPL-SCNC: 106 MMOL/L (ref 98–107)
CLARITY UR: CLEAR
CO2 SERPL-SCNC: 18.6 MMOL/L (ref 22–29)
COCAINE UR QL: NEGATIVE
COLOR UR: YELLOW
CREAT SERPL-MCNC: 0.9 MG/DL (ref 0.76–1.27)
DEPRECATED RDW RBC AUTO: 47.2 FL (ref 37–54)
EGFRCR SERPLBLD CKD-EPI 2021: 115.7 ML/MIN/1.73
EOSINOPHIL # BLD AUTO: 0.02 10*3/MM3 (ref 0–0.4)
EOSINOPHIL NFR BLD AUTO: 0.1 % (ref 0.3–6.2)
ERYTHROCYTE [DISTWIDTH] IN BLOOD BY AUTOMATED COUNT: 13.2 % (ref 12.3–15.4)
ETHANOL BLD-MCNC: 53 MG/DL (ref 0–10)
ETHANOL UR QL: 0.05 %
FLUAV RNA RESP QL NAA+PROBE: NOT DETECTED
FLUBV RNA RESP QL NAA+PROBE: NOT DETECTED
GLOBULIN UR ELPH-MCNC: 2.8 GM/DL
GLUCOSE SERPL-MCNC: 85 MG/DL (ref 65–99)
GLUCOSE UR STRIP-MCNC: NEGATIVE MG/DL
HCT VFR BLD AUTO: 47.4 % (ref 37.5–51)
HGB BLD-MCNC: 16.1 G/DL (ref 13–17.7)
HGB UR QL STRIP.AUTO: NEGATIVE
HOLD SPECIMEN: NORMAL
HOLD SPECIMEN: NORMAL
IMM GRANULOCYTES # BLD AUTO: 0.22 10*3/MM3 (ref 0–0.05)
IMM GRANULOCYTES NFR BLD AUTO: 1.3 % (ref 0–0.5)
KETONES UR QL STRIP: ABNORMAL
LEUKOCYTE ESTERASE UR QL STRIP.AUTO: NEGATIVE
LYMPHOCYTES # BLD AUTO: 2.02 10*3/MM3 (ref 0.7–3.1)
LYMPHOCYTES NFR BLD AUTO: 12.2 % (ref 19.6–45.3)
MAGNESIUM SERPL-MCNC: 2 MG/DL (ref 1.6–2.6)
MCH RBC QN AUTO: 32.5 PG (ref 26.6–33)
MCHC RBC AUTO-ENTMCNC: 34 G/DL (ref 31.5–35.7)
MCV RBC AUTO: 95.8 FL (ref 79–97)
METHADONE UR QL SCN: NEGATIVE
MONOCYTES # BLD AUTO: 2.05 10*3/MM3 (ref 0.1–0.9)
MONOCYTES NFR BLD AUTO: 12.4 % (ref 5–12)
NEUTROPHILS NFR BLD AUTO: 12.19 10*3/MM3 (ref 1.7–7)
NEUTROPHILS NFR BLD AUTO: 73.8 % (ref 42.7–76)
NITRITE UR QL STRIP: NEGATIVE
NRBC BLD AUTO-RTO: 0 /100 WBC (ref 0–0.2)
OPIATES UR QL: POSITIVE
OXYCODONE UR QL SCN: NEGATIVE
PCP UR QL SCN: NEGATIVE
PH UR STRIP.AUTO: 6.5 [PH] (ref 5–8)
PLATELET # BLD AUTO: 278 10*3/MM3 (ref 140–450)
PMV BLD AUTO: 9.6 FL (ref 6–12)
POTASSIUM SERPL-SCNC: 3.9 MMOL/L (ref 3.5–5.2)
PROPOXYPH UR QL: NEGATIVE
PROT SERPL-MCNC: 7.2 G/DL (ref 6–8.5)
PROT UR QL STRIP: NEGATIVE
RBC # BLD AUTO: 4.95 10*6/MM3 (ref 4.14–5.8)
SARS-COV-2 RNA RESP QL NAA+PROBE: NOT DETECTED
SODIUM SERPL-SCNC: 139 MMOL/L (ref 136–145)
SP GR UR STRIP: 1.01 (ref 1–1.03)
TRICYCLICS UR QL SCN: NEGATIVE
UROBILINOGEN UR QL STRIP: ABNORMAL
WBC NRBC COR # BLD: 16.54 10*3/MM3 (ref 3.4–10.8)
WHOLE BLOOD HOLD COAG: NORMAL
WHOLE BLOOD HOLD SPECIMEN: NORMAL

## 2023-03-08 PROCEDURE — 82077 ASSAY SPEC XCP UR&BREATH IA: CPT | Performed by: PHYSICIAN ASSISTANT

## 2023-03-08 PROCEDURE — 85025 COMPLETE CBC W/AUTO DIFF WBC: CPT | Performed by: PHYSICIAN ASSISTANT

## 2023-03-08 PROCEDURE — 80053 COMPREHEN METABOLIC PANEL: CPT | Performed by: PHYSICIAN ASSISTANT

## 2023-03-08 PROCEDURE — 83735 ASSAY OF MAGNESIUM: CPT | Performed by: PHYSICIAN ASSISTANT

## 2023-03-08 PROCEDURE — 63710000001 ONDANSETRON ODT 4 MG TABLET DISPERSIBLE: Performed by: PHYSICIAN ASSISTANT

## 2023-03-08 PROCEDURE — 81003 URINALYSIS AUTO W/O SCOPE: CPT | Performed by: PHYSICIAN ASSISTANT

## 2023-03-08 PROCEDURE — C9803 HOPD COVID-19 SPEC COLLECT: HCPCS

## 2023-03-08 PROCEDURE — 87636 SARSCOV2 & INF A&B AMP PRB: CPT | Performed by: PHYSICIAN ASSISTANT

## 2023-03-08 PROCEDURE — 80306 DRUG TEST PRSMV INSTRMNT: CPT | Performed by: PHYSICIAN ASSISTANT

## 2023-03-08 PROCEDURE — 99285 EMERGENCY DEPT VISIT HI MDM: CPT

## 2023-03-08 RX ORDER — NICOTINE 21 MG/24HR
1 PATCH, TRANSDERMAL 24 HOURS TRANSDERMAL ONCE
Status: DISCONTINUED | OUTPATIENT
Start: 2023-03-08 | End: 2023-03-08 | Stop reason: HOSPADM

## 2023-03-08 RX ORDER — ONDANSETRON 4 MG/1
4 TABLET, ORALLY DISINTEGRATING ORAL ONCE
Status: COMPLETED | OUTPATIENT
Start: 2023-03-08 | End: 2023-03-08

## 2023-03-08 RX ADMIN — NICOTINE TRANSDERMAL SYSTEM 1 PATCH: 21 PATCH, EXTENDED RELEASE TRANSDERMAL at 13:43

## 2023-03-08 RX ADMIN — ONDANSETRON 4 MG: 4 TABLET, ORALLY DISINTEGRATING ORAL at 13:43

## 2023-03-08 NOTE — NURSING NOTE
"Pt states he drank \"a lot\" today so that he could drive to our facility for treatment.  Will wait on his ETOH level to return before his assessment.   "

## 2023-03-08 NOTE — NURSING NOTE
Spoke with Dr. Williamson. PT does not want to stay. Reviewed assessment. New orders to discharge home as per PT wishes. [id number].
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
## 2023-03-08 NOTE — NURSING NOTE
PT states the only reason he came in today was for detox. He does not want to stay now.    Once again, denies SI, HI and AVH.

## 2023-03-08 NOTE — NURSING NOTE
PT is a 32 y o male who presents to Intake for Detox; however, PT states he does not want to stay, he wants to go home and smoke a cigarette.     Denies SI, HI and AVH.    PT's last drink was a half of a a fifth, and that was this morning.     COWS 8  CIWA  4    Depression 0/10 and Anxiety is 0/10.    PT states he has been drinking a 1/5 of whisky since he has been 11 years old. His longest period of sobriety is 6  Months.    Eats o.k., and sleeps o.k.

## 2023-04-27 ENCOUNTER — OFFICE VISIT (OUTPATIENT)
Dept: PSYCHIATRY | Facility: CLINIC | Age: 33
End: 2023-04-27
Payer: COMMERCIAL

## 2023-04-27 ENCOUNTER — DOCUMENTATION (OUTPATIENT)
Dept: PSYCHIATRY | Facility: CLINIC | Age: 33
End: 2023-04-27

## 2023-04-27 VITALS — BODY MASS INDEX: 21.48 KG/M2 | HEIGHT: 69 IN | WEIGHT: 145 LBS

## 2023-04-27 DIAGNOSIS — F41.0 PANIC ATTACKS: ICD-10-CM

## 2023-04-27 DIAGNOSIS — R45.4 EXCESSIVE ANGER: ICD-10-CM

## 2023-04-27 DIAGNOSIS — F43.10 POST TRAUMATIC STRESS DISORDER (PTSD): ICD-10-CM

## 2023-04-27 DIAGNOSIS — F12.20 CANNABIS USE DISORDER, MODERATE, DEPENDENCE: ICD-10-CM

## 2023-04-27 DIAGNOSIS — F10.20 ALCOHOL USE DISORDER, SEVERE, DEPENDENCE: ICD-10-CM

## 2023-04-27 DIAGNOSIS — F13.20 SEDATIVE, HYPNOTIC OR ANXIOLYTIC USE DISORDER, SEVERE, DEPENDENCE: ICD-10-CM

## 2023-04-27 DIAGNOSIS — F33.2 SEVERE EPISODE OF RECURRENT MAJOR DEPRESSIVE DISORDER, WITHOUT PSYCHOTIC FEATURES: Primary | ICD-10-CM

## 2023-04-27 DIAGNOSIS — F41.1 GENERALIZED ANXIETY DISORDER: ICD-10-CM

## 2023-04-27 DIAGNOSIS — T14.90XA TRAUMA: ICD-10-CM

## 2023-04-27 RX ORDER — LOPERAMIDE HCL 2 MG
CAPSULE ORAL
COMMUNITY
Start: 2023-01-03

## 2023-04-27 RX ORDER — OMEPRAZOLE 40 MG/1
1 CAPSULE, DELAYED RELEASE ORAL DAILY
COMMUNITY
Start: 2023-01-13

## 2023-04-27 NOTE — PROGRESS NOTES
[facility]  Outpatient  Progress Note   Patient Status:  Established, Annual Review/Update  Name:  Killian Rodas  :  1990  Date of Service: 2023  Time In: 12:50 EDT  Time Out: 2:49  PM    Identifying Information: Killian Rodas is a 32 y.o. male presenting to [facility] for an individual therapy session by Marie Harry, RONALDO -S, NCC, CAMS-II      Chief Complaint: Patient reports problems with depression, anxiety, mood swings and problematic behaviors.     Session Goal:  Patient with explore and process thoughts/feelings/coping skills.    Subjective   HPI:  Patient arrived for session on time, clean and casually dressed without evidence of intoxication, withdrawal, or perceptual disturbance. Patient arrived as: age appropriate.  Patient indicates he is an open and willing participant in today's session.  Patient hasn't been seen by this therapist in over a year (22).  Patient is the spouse of a former patient of this therapist.  As a result, potential conflicts of interest will be discussed with office mgmt and current therapist on record.    Patient rates the severity of anxiety related symptoms (on a scale of 1-10 (10 is the most severe), a 7 and depression at 4. The anxiety symptoms are reported as: been intermittent without a consistent pattern. The depressive symptoms are reported as: improved.     Note:  See PHQ-9/CABRERA-7 worksheets dated 2023).     PHQ-9 Total Score: 22  20-27 = Severe depression  CABRERA 7 Total Score: 21 15-21 = Severe anxiety    Somatic Symptoms:  Patient endorses health concerns within the past 4 weeks: stomach pains, pain in arms, legs, joints, or hips. , feeling tired or having little energy, trouble falling or staying asleep or sleeping too much.  , headaches, pounding heart or racing and shortness of breath  It is recommended this individual follow up with the identified PCP to address any medical concerns.    Substance  "Use:      ? Nicotine: current vape user and current every day smoker   ? Education:  This individual is encouraged to quit smoking. The nature of nicotine addiction is discussed and the adverse health conditions related to smoking are reviewed. Killian Rodas is aware various alternatives are available to help and is not interested in smoking cessation at this time.   ? Marijuana:  current  (If current, please explain:  1-2 joints nightly)  ? Education:  Negative effects of THC use discussed and patient advised against use of THC. Long term effects reviewed including but not limited to:  potential interruption of brain connectivity,  poor memory, impaired cognition, psychosis fall, oversedation especially when used with opioids.  ? Opiates:   denies  (If current, please explain:     ? Education:  Risks including addiction, benefits, and alternatives presented to patient.   ? Alcohol:  by history/denies  (If current, please explain:  Pt shares he \"only\" drinks a double bourbon whenever he gets to go out to eat; maybe monthly)   ? Education:  The potential consequences of long-term alcohol consumption at present levels were discussed.  Moderation to less than 2 drinks in any 24-hour period was encouraged.  Reducing alcohol use, coupled with exercising,may reduce the risk of serious problems with physical health, brain functioning, and other internal organs. Long-term alcohol use can also raise the risk for certain cancers and cause problems with mental health (depression or anxiety).  ? IOP:  Patient meets criteria for a CD/ IOP referral  - Advisement to take part in and remain active in:  12 Step Recovery Meetings, 1:1 therapy/counseling, establish/maintain an active relationship with a recovery sponsor, continued monitoring for illicit substances for patient safety, medication compliance and future care.    Recent labs:    No visits with results within 3 Week(s) from this visit.   Latest known visit with results is: "   Admission on 03/08/2023, Discharged on 03/08/2023   Component Date Value Ref Range Status   • Glucose 03/08/2023 85  65 - 99 mg/dL Final   • BUN 03/08/2023 13  6 - 20 mg/dL Final   • Creatinine 03/08/2023 0.90  0.76 - 1.27 mg/dL Final   • Sodium 03/08/2023 139  136 - 145 mmol/L Final   • Potassium 03/08/2023 3.9  3.5 - 5.2 mmol/L Final   • Chloride 03/08/2023 106  98 - 107 mmol/L Final   • CO2 03/08/2023 18.6 (L)  22.0 - 29.0 mmol/L Final   • Calcium 03/08/2023 9.4  8.6 - 10.5 mg/dL Final   • Total Protein 03/08/2023 7.2  6.0 - 8.5 g/dL Final   • Albumin 03/08/2023 4.4  3.5 - 5.2 g/dL Final   • ALT (SGPT) 03/08/2023 21  1 - 41 U/L Final   • AST (SGOT) 03/08/2023 28  1 - 40 U/L Final   • Alkaline Phosphatase 03/08/2023 66  39 - 117 U/L Final   • Total Bilirubin 03/08/2023 0.4  0.0 - 1.2 mg/dL Final   • Globulin 03/08/2023 2.8  gm/dL Final   • A/G Ratio 03/08/2023 1.6  g/dL Final   • BUN/Creatinine Ratio 03/08/2023 14.4  7.0 - 25.0 Final   • Anion Gap 03/08/2023 14.4  5.0 - 15.0 mmol/L Final   • eGFR 03/08/2023 115.7  >60.0 mL/min/1.73 Final   • Color, UA 03/08/2023 Yellow  Yellow, Straw Final   • Appearance, UA 03/08/2023 Clear  Clear Final   • pH, UA 03/08/2023 6.5  5.0 - 8.0 Final   • Specific Gravity, UA 03/08/2023 1.014  1.005 - 1.030 Final   • Glucose, UA 03/08/2023 Negative  Negative Final   • Ketones, UA 03/08/2023 40 mg/dL (2+) (A)  Negative Final   • Bilirubin, UA 03/08/2023 Negative  Negative Final   • Blood, UA 03/08/2023 Negative  Negative Final   • Protein, UA 03/08/2023 Negative  Negative Final   • Leuk Esterase, UA 03/08/2023 Negative  Negative Final   • Nitrite, UA 03/08/2023 Negative  Negative Final   • Urobilinogen, UA 03/08/2023 1.0 E.U./dL  0.2 - 1.0 E.U./dL Final   • Ethanol 03/08/2023 53 (H)  0 - 10 mg/dL Final   • Ethanol % 03/08/2023 0.053  % Final   • THC, Screen, Urine 03/08/2023 Positive (A)  Negative Final   • Phencyclidine (PCP), Urine 03/08/2023 Negative  Negative Final   • Cocaine  Screen, Urine 03/08/2023 Negative  Negative Final   • Methamphetamine, Ur 03/08/2023 Negative  Negative Final   • Opiate Screen 03/08/2023 Positive (A)  Negative Final   • Amphetamine Screen, Urine 03/08/2023 Negative  Negative Final   • Benzodiazepine Screen, Urine 03/08/2023 Negative  Negative Final   • Tricyclic Antidepressants Screen 03/08/2023 Negative  Negative Final   • Methadone Screen, Urine 03/08/2023 Negative  Negative Final   • Barbiturates Screen, Urine 03/08/2023 Negative  Negative Final   • Oxycodone Screen, Urine 03/08/2023 Negative  Negative Final   • Propoxyphene Screen 03/08/2023 Negative  Negative Final   • Buprenorphine, Screen, Urine 03/08/2023 Negative  Negative Final   • Magnesium 03/08/2023 2.0  1.6 - 2.6 mg/dL Final   • COVID19 03/08/2023 Not Detected  Not Detected - Ref. Range Final   • Influenza A PCR 03/08/2023 Not Detected  Not Detected Final   • Influenza B PCR 03/08/2023 Not Detected  Not Detected Final   • WBC 03/08/2023 16.54 (H)  3.40 - 10.80 10*3/mm3 Final   • RBC 03/08/2023 4.95  4.14 - 5.80 10*6/mm3 Final   • Hemoglobin 03/08/2023 16.1  13.0 - 17.7 g/dL Final   • Hematocrit 03/08/2023 47.4  37.5 - 51.0 % Final   • MCV 03/08/2023 95.8  79.0 - 97.0 fL Final   • MCH 03/08/2023 32.5  26.6 - 33.0 pg Final   • MCHC 03/08/2023 34.0  31.5 - 35.7 g/dL Final   • RDW 03/08/2023 13.2  12.3 - 15.4 % Final   • RDW-SD 03/08/2023 47.2  37.0 - 54.0 fl Final   • MPV 03/08/2023 9.6  6.0 - 12.0 fL Final   • Platelets 03/08/2023 278  140 - 450 10*3/mm3 Final   • Neutrophil % 03/08/2023 73.8  42.7 - 76.0 % Final   • Lymphocyte % 03/08/2023 12.2 (L)  19.6 - 45.3 % Final   • Monocyte % 03/08/2023 12.4 (H)  5.0 - 12.0 % Final   • Eosinophil % 03/08/2023 0.1 (L)  0.3 - 6.2 % Final   • Basophil % 03/08/2023 0.2  0.0 - 1.5 % Final   • Immature Grans % 03/08/2023 1.3 (H)  0.0 - 0.5 % Final   • Neutrophils, Absolute 03/08/2023 12.19 (H)  1.70 - 7.00 10*3/mm3 Final   • Lymphocytes, Absolute 03/08/2023 2.02   "0.70 - 3.10 10*3/mm3 Final   • Monocytes, Absolute 03/08/2023 2.05 (H)  0.10 - 0.90 10*3/mm3 Final   • Eosinophils, Absolute 03/08/2023 0.02  0.00 - 0.40 10*3/mm3 Final   • Basophils, Absolute 03/08/2023 0.04  0.00 - 0.20 10*3/mm3 Final   • Immature Grans, Absolute 03/08/2023 0.22 (H)  0.00 - 0.05 10*3/mm3 Final   • nRBC 03/08/2023 0.0  0.0 - 0.2 /100 WBC Final   • Extra Tube 03/08/2023 Hold for add-ons.   Final    Auto resulted.   • Extra Tube 03/08/2023 hold for add-on   Final    Auto resulted   • Extra Tube 03/08/2023 Hold for add-ons.   Final    Auto resulted.   • Extra Tube 03/08/2023 Hold for add-ons.   Final    Auto resulted     Objective    Medical History: Areas Reviewed: The following portions of the patient's history were reviewed and updated as appropriate: allergies, current medications, past family history, past medical history, past social history, past surgical history and problem list.    Vitals:  Weight:  65.8 kg (145 lb)  Height: 175.3 cm (69\")  BMI:  Body mass index is 21.41 kg/m².  • Education:  The benefits of a healthy diet and exercise were discussed with patient, especially the positive effects they have on mental health. Patient encouraged to consider lifestyle modification regarding  diet and exercise patterns to maximize results of mental health treatment.    Medication Review:    Current Outpatient Medications:   •  ARIPiprazole (Abilify) 2 MG tablet, Take 1 tablet by mouth Daily., Disp: 30 tablet, Rfl: 0  •  cromolyn (NASALCHROM) 5.2 MG/ACT nasal spray, USE 1 DOSE IN EACH NOSTRIL EVERY 6 HOURS, Disp: , Rfl:   •  Diclofenac Sodium (VOLTAREN) 1 % gel gel, APPLY 4 GRAMS TO THE AFFECTED AREA 4 TIMES PER DAY AS NEEED, Disp: , Rfl:   •  famotidine (PEPCID) 20 MG tablet, TAKE 1 TABLET BY MOUTH EVERY 12 HOURS FOR 14 DAYS, Disp: , Rfl:   •  FLUoxetine (PROzac) 40 MG capsule, Take 1 capsule by mouth Daily., Disp: 30 capsule, Rfl: 1  •  Imodium A-D 2 MG capsule, TAKE 2 CAPSULES BY MOUTH ONE TIME " THEN 1 CAPSULE AFTER EACH LOOSE STOOL MAX 4 CAPSULES PER 24 HOURS, Disp: , Rfl:   •  methocarbamol (ROBAXIN) 500 MG tablet, TAKE 1 TABLET BY MOUTH EVERY 8 HOURS AS NEEDED FOR MUSCLE SPASM FOR 7 DAYS, Disp: , Rfl:   •  omeprazole (priLOSEC) 40 MG capsule, Take 1 capsule by mouth Daily., Disp: , Rfl:   •  ondansetron ODT (ZOFRAN-ODT) 4 MG disintegrating tablet, , Disp: , Rfl:   •  ProAir  (90 Base) MCG/ACT inhaler, INHALE 2 PUFFS BY MOUTH THREE TIMES DAILY AS NEEDED FOR 10 DAYS, Disp: , Rfl:      Medication Compliance:  compliance with medication regimen; Side Effects reported:  no.  Explain:  Needs a referral for psych med mgmt.    Assessment & Plan    -Trauma Assessment:  Patient denies having been hit, slapped, kicked or otherwise physically hurt by others since last visit as well as having been forced to engage in any unwanted sexual acts since last visit.       Risk Assessment:  Patient adamantly and convincingly denies having SI/HI with or without intent, plans, or means. Patient denies having Hallucinations/Illusions and Delusions.  Patient  denies self-harming behaviors including: Other:  burning self  (since 08/22) Patient does not appear to be malingering.     Risk Level: History obtained from: patient and chart review.  Due to historical context and reported clinical markers, it appears patient meets criteria for LOW RISK to engage in self-harm or harm to others.  It is recommended Killian be evaluated and assessed each contact for intent, plan, means and/or lethality each contact.    Behavior Health Review Of Systems:  Pertinent items are noted in HPI.    MENTAL STATUS EXAM   General Appearance:  Cleanly groomed and dressed and well developed  Eye Contact:  Good eye contact  Attitude:  Cooperative and polite  Motor Activity:  Slow and other  Other Comment:  Limp  Muscle Strength:  Normal  Speech:  Normal rate, tone, volume  Language:  Spontaneous  Mood and affect:  Normal, pleasant, euthymic and  appropriate  Hopelessness:  5 and 6  Associations/ Thought Content:  No delusions  Hallucinations:  None  Suicidal Ideations:  Not present  Sensorium:  Alert and clear  Orientation:  Person, place, time and situation  Immediate Recall, Recent, and Remote Memory:  Intact  Attention Span/ Concentration:  Good  Fund of Knowledge:  Appropriate for age and educational level  Intellectual Functioning:  Average range  Insight:  Fair  Judgement:  Fair  Reliability:  Other  Other Comment:  Unsure  Impulse Control:  Impaired     Treatment plan status:  Active, Reviewed, New Treatment Plan Required and Annual Update   Treatment plan progress: New  Prognosis: Guarded with Ongoing Treatment  Functional Status: Severe impairment    Session Summary: Therapist met individually with patient this date. Discussed progress in treatment and any needs/concerns.  Intake Review/Update with new treatment plan initiated due to it being over a year since last contact. Therapist discussed patient triggers associated with The primary encounter diagnosis was Severe episode of recurrent major depressive disorder, without psychotic features. Diagnoses of Post traumatic stress disorder (PTSD), Generalized anxiety disorder, Panic attacks, Cannabis use disorder, moderate, dependence, Alcohol use disorder, severe, dependence, Sedative, hypnotic or anxiolytic use disorder, severe, dependence, Excessive anger, and Trauma were also pertinent to this visit. Patient discussed progress in treatment and any needs/concerns.      Visit Diagnosis/Plan  Problems Addressed this Visit    None  Visit Diagnoses     Severe episode of recurrent major depressive disorder, without psychotic features    -  Primary    Post traumatic stress disorder (PTSD)        Generalized anxiety disorder        Panic attacks        Cannabis use disorder, moderate, dependence        Alcohol use disorder, severe, dependence        Sedative, hypnotic or anxiolytic use disorder, severe,  dependence        Excessive anger        Trauma          Diagnoses       Codes Comments    Severe episode of recurrent major depressive disorder, without psychotic features    -  Primary ICD-10-CM: F33.2  ICD-9-CM: 296.33     Post traumatic stress disorder (PTSD)     ICD-10-CM: F43.10  ICD-9-CM: 309.81     Generalized anxiety disorder     ICD-10-CM: F41.1  ICD-9-CM: 300.02     Panic attacks     ICD-10-CM: F41.0  ICD-9-CM: 300.01     Cannabis use disorder, moderate, dependence     ICD-10-CM: F12.20  ICD-9-CM: 304.30     Alcohol use disorder, severe, dependence     ICD-10-CM: F10.20  ICD-9-CM: 303.90     Sedative, hypnotic or anxiolytic use disorder, severe, dependence     ICD-10-CM: F13.20  ICD-9-CM: 304.10     Excessive anger     ICD-10-CM: R45.4  ICD-9-CM: 312.00     Trauma     ICD-10-CM: T14.90XA  ICD-9-CM: 959.9         Clinical Maneuvering:  • All treatment options available at [facility] explored and documented.  • The benefits of a healthy diet and exercise were discussed with patient, especially the positive effects they have on mental health. Patient encouraged to consider lifestyle modification regarding  diet and exercise patterns to maximize results of mental health treatment.  • Reviewed previous available documentation  • Reviewed most recent available labs     Justification for therapy: Patient continues to struggle with a chronic/pervasive mental illness which continues to cause impairment in at least two important important areas of functioning.  Patient appear(s) to maintain relative stability as compared to the  baseline measure.  Patient can reasonably be expected to continue to benefit from treatment and would likely be at increased risk for decompensation if treatment were stopped.  Patient endorses a positive benefit from theapy and appears to meet outpatient level of care. Patient expresses gratitude and reports he had a positive experience today.    Recommended  Referrals: Psychiatrist/APRN, Medical Provider (PCP) and IOP    Follow Up:  Return in about 2 weeks, or earlier if symptoms worsen or fail to improve.  The patient understands that treatment is conditional on adhering to all [facility] Outpatient Policy and Procedures.  The patient understands that providers/clinic has discretion to dismissed them from care if these are breached and a recommendation for further care will be made at time of discharge.    Future Appointments       Provider Department Center    5/18/2023 11:00 AM Marie Harry [facility]          This document electronically signed by Marie Harry [facility] April 27, 2023 18:42 EDT    DISCLOSURE: This document is intended for medical expert use only. Reading of this document by patients and/or patient's family without participating in medical staff guidance may result in misinterpretation and unintended morbidity. Any interpretation of such data is the responsibility of the patient and/or family member responsible for the patient in concert with their primary or specialist providers, and NOT to be left for sources of online searches such as [employer], [employer] or similar queries. Relying on these approaches to knowledge may result in misinterpretation, misguided goals of care and even death should patients or family members try recommendations outside of the realm of professional medical care in a supervised way.    Eric Disclaimer:  Please note that portions of this documentation may have been completed with a voice recognition program.  Efforts were made to edit this dictation, but occasional words may have been mistranscribed.

## 2023-04-27 NOTE — LETTER
April 27, 2023     Patient: Killian Rodas   YOB: 1990   Date of Visit: 4/27/2023       To Whom It May Concern:    This is to verify Killian Rodas was seen in my office today, 04/27/23 at 12:45 pm.  He has another schedule appointment with me on 05/18/23 at 11:00 am.  He and I will discuss treatment options and recommendations to help him deal with his current issues.       Sincerely,        Marie Harry, RONALDO-S, NCC, CMS-II    CC:   No Recipients

## 2023-04-27 NOTE — TREATMENT PLAN
Multi-Disciplinary Problems (from Behavioral Health Treatment Plan)    Active Problems     Problem: Anger  Start Date: 12/01/21    Problem Details: The patient self-scales this problem as a 8 with 10 being the worst.      Goal Priority Start Date Expected End Date End Date    Patient will develop specific, socially acceptable way to manage anger. -- 12/01/21 -- --    Goal Details: Progress toward goal:  Pt has [id number] since onset of treatment.  He is learning to identify faulty thoughts and is beginning to face difficult emotions as well.    Goal Intervention Frequency Start Date End Date    Process patient's angry feelings or outbursts that have recently occurred and review alternative behaviors Q Month 12/01/21 --    Intervention Details: Duration of treatment until until remission of symptoms.      Goal Intervention Frequency Start Date End Date    Work with patient to develop constructive way to handle anger. Q Month 12/01/21 --    Intervention Details: Duration of treatment until until remission of symptoms.            Problem: Anxiety  Start Date: 12/01/21    Problem Details: The patient self-scales this problem as a 8.5 with 10 being the worst.      Goal Priority Start Date Expected End Date End Date    Patient will develop and implement behavioral and cognitive strategies to reduce anxiety and irrational fears. -- 12/01/21 -- --    Goal Details: Progress toward goal:  Pt has [id number] since onset of treatment.  He is learning to identify faulty thoughts and is beginning to face difficult emotions as well      Goal Intervention Frequency Start Date End Date    Help patient explore past emotional issues in relation to present anxiety. Q Month 12/01/21 --    Intervention Details: Duration of treatment until until remission of symptoms.      Goal Intervention Frequency Start Date End Date    Help patient develop an awareness of their cognitive and physical responses to anxiety. Q Month 12/01/21 --     Intervention Details: Duration of treatment until until remission of symptoms.            Problem: Depression  Start Date: 12/01/21    Problem Details: The patient self-scales this problem as a 8.5 with 10 being the worst.        Goal Priority Start Date Expected End Date End Date    Patient will demonstrate the ability to initiate new constructive life skills outside of sessions on a consistent basis. -- 12/01/21 -- --    Goal Details: Progress toward goal:  Pt has [id number] since onset of treatment.  He is learning to identify faulty thoughts and is beginning to face difficult emotions as well      Goal Intervention Frequency Start Date End Date    Assist patient in setting attainable activities of daily living goals. PRN 12/01/21 --    Goal Intervention Frequency Start Date End Date    Provide education about depression Q Month 12/01/21 --    Intervention Details: Duration of treatment until until remission of symptoms.      Goal Intervention Frequency Start Date End Date    Assist patient in developing healthy coping strategies. Q Month 12/01/21 --    Intervention Details: Duration of treatment until until remission of symptoms.                         I have discussed and reviewed this treatment plan with the patient.  It has been printed for signatures.

## 2023-04-27 NOTE — PROGRESS NOTES
Therapist informed patient via [employer] messaging that another appointment needs to be made due to 2 week recommendation.    Susannah Harry, LPCC-S, NCC, CAMS-II